# Patient Record
(demographics unavailable — no encounter records)

---

## 2024-12-18 NOTE — RISK ASSESSMENT
[Clinical Interview] : Clinical Interview [Collateral Sources] : Collateral Sources [No known suicide factors] : No known suicide factors [Non-compliant or not receiving treatment] : non-compliant or not receiving treatment [None known] : None known [Identifies reasons for living] : identifies reasons for living [Supportive social network of family or friends] : supportive social network of family or friends [Ability to cope with stress] : ability to cope with stress [Responsibility to children, family, or others] : responsibility to children, family, or others [Engaged in work or school] : engaged in work or school [None in the patient's lifetime] : None in the patient's lifetime [None Known] : none known [No] : no [No known risk factors] : No known risk factors [Residential stability] : residential stability [Relationship stability] : relationship stability [Affective stability] : affective stability [Sobriety] : sobriety [Yes] : yes [de-identified] : no access to either reported.

## 2024-12-18 NOTE — REASON FOR VISIT
[Behavioral Health Urgent Care Assessment] : a behavioral health urgent care assessment [Patient] : patient [Self] : alone [Father] : with father [TextBox_17] : for help connecting to pediatric neurology for presenting inattentive symptoms.

## 2024-12-18 NOTE — PLAN
[Contact was Attempted] : contact was attempted [TextBox_9] : Appointment made with pediatric neurology to further evaluate ADHD diagnosis and tx options [TextBox_11] :  no clinical indication for pharmacotherapy at this time [TextBox_13] : no acute safety concerns at this time [TextBox_26] : self-referred, school consent not provided.

## 2024-12-18 NOTE — HISTORY OF PRESENT ILLNESS
[Not Applicable] : Not applicable [FreeTextEntry1] : Patient is a 11 year-old male, domiciled with parents and sibling, enrolled in [employer], in the 6th grade regular education, with no prior psychiatric history, no h/o outpatient treatment, no h/o psychiatric hospitalizations, no h/o of self-injury or suicide attempts, no h/o aggression/violence/legal issues, no CPS involvement, no substance use, no known trauma hx, no significant pmhx, now presenting accompanied by his father as he was self-referred for help connecting to pediatric neurology for presenting inattentive symptoms.   Patient presented as calm and cooperative while acknowledging that he was unsure of why he is here for today's evaluation. The patient does endorse the following the symptoms of inattention: having a short attention span and being easily distracted; making careless mistakes - for example, rushing schoolwork; appearing forgetful or losing things; being unable to stick to tasks that are tedious or time-consuming; appearing to be unable to listen to or carry out instructions, often getting bored and changing activity or task; having difficulty organizing tasks and trouble with time management. He denies symptoms of depression including depressed and irritable mood, disturbed sleep and appetite, low energy, anhedonia, hopelessness, worthlessness, si/i/p, h/o SA and SIB. He denies anxiety symptoms of excessive worry, general feeling of tension, fear of something terrible happening to them or their families, panic attacks, social anxiety, and OCD symptoms. He denies manic symptoms of decreased need for sleep, increased goal directed activity and grandiosity. He denies psychotic symptoms of AVH and paranoid delusions. He denies HI/i/p and aggressive thoughts. He denies substance use. He denies abuse hx.  Patient's father provided collateral. School consent was not provided upon arrival to today's evaluation. Father endorsed the patient's inattentive symptoms above as the patient also bites his fingernails to often cope with some of these symptoms. Father shared a list of feedback provided by the patient's schoolteachers where they each noticed an issue with the patient's ability to remain focused on attentive in class. Patient has even forgot to hand in assignments despite recently completing them and often struggles to stay on topic or remain seated where he is at. Father endorsed that these symptoms are particularly present in school and not with hobbies/activities that he cares or is passionate about. He reports the patient's mood is relatively stable as evidenced by most being content/neutral despite being irritable when his stressors are high. Patient has never voiced any SI/HI. Father denies any aggression or oppositional behavior. Father denies any symptoms of antonino or psychosis. He denied any current substance usage in the home. He denied any acute safety concerns at this time and denied any present concerns with the patient as it comes to SI/HI.   [FreeTextEntry2] : No past psychiatric history.  [FreeTextEntry3] : No past psychiatric medications.

## 2024-12-18 NOTE — DISCUSSION/SUMMARY
[Low acute suicide risk] : Low acute suicide risk [No] : No [Not clinically indicated] : Safety Plan completed/updated (for individuals at risk): Not clinically indicated [FreeTextEntry1] : Risk factors: male gender, h/o ADHD symptoms, not in treatment, family history of psychiatric illness/substance abuse.   Protective factors: domiciled with supportive family, engaged in school, no prior SA or SIB, not current si/i/p, no h/o violence, no current hi/i/p, future oriented with short- and long-term goals, barriers to suicide, no access to guns, not acutely manic or psychotic, no substance abuse, no trauma hx, no family history of suicide.

## 2024-12-18 NOTE — PHYSICAL EXAM
[Cooperative] : cooperative [Euthymic] : euthymic [Full] : full [Clear] : clear [Linear/Goal Directed] : linear/goal directed [Average] : average [WNL] : within normal limits [Positive interaction] : positive interaction [Unremarkable/age appropriate] : unremarkable/age appropriate [Normal] : normal [Accelerated] : accelerated [None] : none [None Reported] : none reported

## 2024-12-18 NOTE — HISTORY OF PRESENT ILLNESS
[Not Applicable] : Not applicable [FreeTextEntry1] : Patient is a 11 year-old male, domiciled with parents and sibling, enrolled in [employer], in the 6th grade regular education, with no prior psychiatric history, no h/o outpatient treatment, no h/o psychiatric hospitalizations, no h/o of self-injury or suicide attempts, no h/o aggression/violence/legal issues, no CPS involvement, no substance use, no known trauma hx, no significant pmhx, now presenting accompanied by his father as he was self-referred for help connecting to pediatric neurology for presenting inattentive symptoms.   Patient presented as calm and cooperative while acknowledging that he was unsure of why he is here for today's evaluation. The patient does endorse the following the symptoms of inattention: having a short attention span and being easily distracted; making careless mistakes - for example, rushing schoolwork; appearing forgetful or losing things; being unable to stick to tasks that are tedious or time-consuming; appearing to be unable to listen to or carry out instructions, often getting bored and changing activity or task; having difficulty organizing tasks and trouble with time management. He denies symptoms of depression including depressed and irritable mood, disturbed sleep and appetite, low energy, anhedonia, hopelessness, worthlessness, si/i/p, h/o SA and SIB. He denies anxiety symptoms of excessive worry, general feeling of tension, fear of something terrible happening to them or their families, panic attacks, social anxiety, and OCD symptoms. He denies manic symptoms of decreased need for sleep, increased goal directed activity and grandiosity. He denies psychotic symptoms of AVH and paranoid delusions. He denies HI/i/p and aggressive thoughts. He denies substance use. He denies abuse hx.  Patient's father provided collateral. School consent was not provided upon arrival to today's evaluation. Father endorsed the patient's inattentive symptoms above as the patient also bites his fingernails to often cope with some of these symptoms. Father shared a list of feedback provided by the patient's schoolteachers where they each noticed an issue with the patient's ability to remain focused on attentive in class. Patient has even forgot to hand in assignments despite recently completing them and often struggles to stay on topic or remain seated where he is at. Father endorsed that these symptoms are particularly present in school and not with hobbies/activities that he cares or is passionate about. He reports the patient's mood is relatively stable as evidenced by most being content/neutral despite being irritable when his stressors are high. Patient has never voiced any SI/HI. Father denies any aggression or oppositional behavior. Father denies any symptoms of antonino or psychosis. He denied any current substance usage in the home. He denied any acute safety concerns at this time and denied any present concerns with the patient as it comes to SI/HI.   [FreeTextEntry2] : No past psychiatric history.  [FreeTextEntry3] : No past psychiatric medications.

## 2024-12-18 NOTE — RISK ASSESSMENT
[Clinical Interview] : Clinical Interview [Collateral Sources] : Collateral Sources [No known suicide factors] : No known suicide factors [Non-compliant or not receiving treatment] : non-compliant or not receiving treatment [None known] : None known [Identifies reasons for living] : identifies reasons for living [Supportive social network of family or friends] : supportive social network of family or friends [Ability to cope with stress] : ability to cope with stress [Responsibility to children, family, or others] : responsibility to children, family, or others [Engaged in work or school] : engaged in work or school [None in the patient's lifetime] : None in the patient's lifetime [None Known] : none known [No] : no [No known risk factors] : No known risk factors [Residential stability] : residential stability [Relationship stability] : relationship stability [Affective stability] : affective stability [Sobriety] : sobriety [Yes] : yes [de-identified] : no access to either reported.

## 2025-01-14 NOTE — CONSULT LETTER
[Dear  ___] : Dear  [unfilled], [Consult Letter:] : I had the pleasure of evaluating your patient, [unfilled]. [Consult Closing:] : Thank you very much for allowing me to participate in the care of this patient.  If you have any questions, please do not hesitate to contact me. [Sincerely,] : Sincerely, [FreeTextEntry3] : GAURANG Lorenzo-C Certified Family Nurse Practitioner Pediatric Neurology [facility] [address] [place] [address] Tel: [phone]. Fax: [phone]

## 2025-01-14 NOTE — PLAN
[FreeTextEntry1] : [ ] Accommodations letter provided to parent [ ] Discussed use of Omega 3 fish oil [ ] Discussed use of medications as well as side effects if accommodations do not improve school performance [ ] Follow up 6 months to a year

## 2025-01-14 NOTE — HISTORY OF PRESENT ILLNESS
[FreeTextEntry1] : AMOS is a 11 year old male here for initial evaluation of inattention.    Amso struggled academically during first marking period, he forgets to hand in assignments.  and other teachers have reached out to parent. He does not sit still and loses focus easily. He can distract himself and others. Concerns in  and first grade with hyperactivity. He can be too sociable. In fifth grade he needed to attend summer school, but then never attended. He struggles in [employer]. He is supposed to be having reading and math support but he does not attend during school.   Educational assessment:  Current Grade:  6th grade  Current District: [facility] ED/ Current Accommodations/ICT: General education classroom setting.   Home assessment: He can do his morning routine on his own and get himself out of the house. He does homework independently, but he does not typically complete it. He is constantly fidgeting through meals. He has a n older brother (15 y/o) whom he argues with often. Amos usually antagonizes the other one. Socially, he does well with other children. No issues with friendships. Amos enjoys playing travel lacrosse and football. He enjoys playing video games on Vox[employer]ter and evette laptop. It is limited during the school week. No concern for anxiety, depression, ODD. He can be OCD with leaving on time. He can be oppositional with his parents. Bedtime: 10pm, asleep right away. He sleeps through the night. At school he wakes up at 6:55 am for school. Denies staring, eye fluttering, twitching, seizure or seizure-like activity. No serious head injury, meningoencephalitis. According to parent, at home more issues with inattention at home. He cannot follow through with a multistep direction. He loses things often.   [place] Forms Score Parent: ADD - () Hyperactivity - () ODD  - (48) Conduct Disorder  (3/14) Anxiety/depression- 2- (3/)  Performance AV  Teacher:  ADD - () Hyperactivity - (6) ODD/ Conduct Disorder 0/10 - (4/10) Anxiety/depression- 0/7- (3/7)  Performance Avg 4

## 2025-01-14 NOTE — BIRTH HISTORY
[At Term] : at term [ Section] : by  section [None] : there were no delivery complications [Motor Delay w/ Normal Speech] : patient has motor delay with normal speech [de-identified] : rpt c section  [FreeTextEntry3] : EI 18 months, did not pull to stand

## 2025-01-14 NOTE — PHYSICAL EXAM
[Well-appearing] : well-appearing [Normocephalic] : normocephalic [No dysmorphic facial features] : no dysmorphic facial features [Neck supple] : neck supple [Straight] : straight [No deformities] : no deformities [Alert] : alert [Well related, good eye contact] : well related, good eye contact [Conversant] : conversant [Normal speech and language] : normal speech and language [Follows instructions well] : follows instructions well [VFF] : VFF [Pupils reactive to light and accommodation] : pupils reactive to light and accommodation [Full extraocular movements] : full extraocular movements [Normal facial sensation to light touch] : normal facial sensation to light touch [No facial asymmetry or weakness] : no facial asymmetry or weakness [Gross hearing intact] : gross hearing intact [Equal palate elevation] : equal palate elevation [Good shoulder shrug] : good shoulder shrug [Normal tongue movement] : normal tongue movement [Midline tongue, no fasciculations] : midline tongue, no fasciculations [Normal axial and appendicular muscle tone] : normal axial and appendicular muscle tone [Gets up on table without difficulty] : gets up on table without difficulty [No pronator drift] : no pronator drift [Normal finger tapping and fine finger movements] : normal finger tapping and fine finger movements [No abnormal involuntary movements] : no abnormal involuntary movements [5/5 strength in proximal and distal muscles of arms and legs] : 5/5 strength in proximal and distal muscles of arms and legs [Walks and runs well] : walks and runs well [Able to do deep knee bend] : able to do deep knee bend [Able to walk on heels] : able to walk on heels [Able to walk on toes] : able to walk on toes [Knee jerks] : knee jerks [Localizes LT and temperature] : localizes LT and temperature [No dysmetria on FTNT] : no dysmetria on FTNT [Good walking balance] : good walking balance [Normal gait] : normal gait [Able to tandem well] : able to tandem well [Negative Romberg] : negative Romberg [de-identified] : Breathing even and unlabored

## 2025-01-14 NOTE — ASSESSMENT
[FreeTextEntry1] :  ELSA is a 11 year old male presenting for initial evaluation of inattention/hyperactivity   ELSA is in a general education 6th grade classroom setting with no services at this time. Concerns for inattention have been apparent since . Struggled academically during first quarter of middle school, doing slightly better during this quarter but continues to forget to submit homework. No concerns for staring episodes, twitching, rapid eye blinking or seizure-like activity. Non focal neurological exam. [place] forms reviewed, consistent with ADHD- Inattentive type, will provide accommodations letter.